# Patient Record
Sex: MALE | Race: WHITE | NOT HISPANIC OR LATINO | Employment: OTHER | URBAN - METROPOLITAN AREA
[De-identification: names, ages, dates, MRNs, and addresses within clinical notes are randomized per-mention and may not be internally consistent; named-entity substitution may affect disease eponyms.]

---

## 2023-10-05 ENCOUNTER — HOSPITAL ENCOUNTER (EMERGENCY)
Facility: HOSPITAL | Age: 68
Discharge: HOME | End: 2023-10-05
Attending: EMERGENCY MEDICINE | Admitting: EMERGENCY MEDICINE
Payer: MEDICARE

## 2023-10-05 VITALS
BODY MASS INDEX: 34.46 KG/M2 | SYSTOLIC BLOOD PRESSURE: 135 MMHG | OXYGEN SATURATION: 96 % | RESPIRATION RATE: 18 BRPM | HEART RATE: 111 BPM | TEMPERATURE: 97.6 F | HEIGHT: 73 IN | DIASTOLIC BLOOD PRESSURE: 95 MMHG | WEIGHT: 260 LBS

## 2023-10-05 PROCEDURE — 4500999001 HC ED NO CHARGE

## 2023-10-05 PROCEDURE — 99283 EMERGENCY DEPT VISIT LOW MDM: CPT | Performed by: EMERGENCY MEDICINE

## 2023-10-05 ASSESSMENT — PAIN - FUNCTIONAL ASSESSMENT: PAIN_FUNCTIONAL_ASSESSMENT: 0-10

## 2023-10-05 ASSESSMENT — COLUMBIA-SUICIDE SEVERITY RATING SCALE - C-SSRS
2. HAVE YOU ACTUALLY HAD ANY THOUGHTS OF KILLING YOURSELF?: NO
6. HAVE YOU EVER DONE ANYTHING, STARTED TO DO ANYTHING, OR PREPARED TO DO ANYTHING TO END YOUR LIFE?: NO
1. IN THE PAST MONTH, HAVE YOU WISHED YOU WERE DEAD OR WISHED YOU COULD GO TO SLEEP AND NOT WAKE UP?: NO

## 2023-12-24 ENCOUNTER — APPOINTMENT (OUTPATIENT)
Dept: RADIOLOGY | Facility: HOSPITAL | Age: 68
End: 2023-12-24
Payer: MEDICARE

## 2023-12-24 ENCOUNTER — HOSPITAL ENCOUNTER (EMERGENCY)
Facility: HOSPITAL | Age: 68
Discharge: HOME | End: 2023-12-24
Payer: MEDICARE

## 2023-12-24 ENCOUNTER — PHARMACY VISIT (OUTPATIENT)
Dept: PHARMACY | Facility: CLINIC | Age: 68
End: 2023-12-24

## 2023-12-24 VITALS
HEART RATE: 64 BPM | WEIGHT: 260 LBS | DIASTOLIC BLOOD PRESSURE: 85 MMHG | SYSTOLIC BLOOD PRESSURE: 141 MMHG | OXYGEN SATURATION: 100 % | HEIGHT: 73 IN | TEMPERATURE: 98.7 F | RESPIRATION RATE: 18 BRPM | BODY MASS INDEX: 34.46 KG/M2

## 2023-12-24 DIAGNOSIS — S39.012A LUMBAR STRAIN, INITIAL ENCOUNTER: Primary | ICD-10-CM

## 2023-12-24 PROCEDURE — 99283 EMERGENCY DEPT VISIT LOW MDM: CPT | Mod: 25

## 2023-12-24 PROCEDURE — 72100 X-RAY EXAM L-S SPINE 2/3 VWS: CPT

## 2023-12-24 PROCEDURE — 72100 X-RAY EXAM L-S SPINE 2/3 VWS: CPT | Performed by: RADIOLOGY

## 2023-12-24 PROCEDURE — RXMED WILLOW AMBULATORY MEDICATION CHARGE

## 2023-12-24 PROCEDURE — 99284 EMERGENCY DEPT VISIT MOD MDM: CPT

## 2023-12-24 PROCEDURE — 2500000004 HC RX 250 GENERAL PHARMACY W/ HCPCS (ALT 636 FOR OP/ED): Performed by: NURSE PRACTITIONER

## 2023-12-24 PROCEDURE — 96372 THER/PROPH/DIAG INJ SC/IM: CPT

## 2023-12-24 RX ORDER — KETOROLAC TROMETHAMINE 30 MG/ML
30 INJECTION, SOLUTION INTRAMUSCULAR; INTRAVENOUS ONCE
Status: COMPLETED | OUTPATIENT
Start: 2023-12-24 | End: 2023-12-24

## 2023-12-24 RX ORDER — LIDOCAINE 50 MG/G
1 PATCH TOPICAL DAILY
Qty: 5 PATCH | Refills: 0 | Status: SHIPPED | OUTPATIENT
Start: 2023-12-24

## 2023-12-24 RX ORDER — ORPHENADRINE CITRATE 30 MG/ML
60 INJECTION INTRAMUSCULAR; INTRAVENOUS ONCE
Status: COMPLETED | OUTPATIENT
Start: 2023-12-24 | End: 2023-12-24

## 2023-12-24 RX ORDER — MELOXICAM 15 MG/1
15 TABLET ORAL DAILY
Qty: 10 TABLET | Refills: 0 | Status: SHIPPED | OUTPATIENT
Start: 2023-12-24

## 2023-12-24 RX ORDER — CYCLOBENZAPRINE HCL 10 MG
10 TABLET ORAL 3 TIMES DAILY PRN
Qty: 17 TABLET | Refills: 0 | Status: SHIPPED | OUTPATIENT
Start: 2023-12-24

## 2023-12-24 RX ADMIN — KETOROLAC TROMETHAMINE 30 MG: 30 INJECTION, SOLUTION INTRAMUSCULAR at 06:40

## 2023-12-24 RX ADMIN — ORPHENADRINE CITRATE 60 MG: 60 INJECTION INTRAMUSCULAR; INTRAVENOUS at 06:40

## 2023-12-24 NOTE — ED PROVIDER NOTES
Chief Complaint   Patient presents with    Back Pain     Pt went to layn on th floor and threw his back out . Pt walked to the ems cot        HPI       68 year old male presents to the Emergency Department today complaining of a 1-2 day history of lower back pain that he describes as tight in nature, constant, non-radiating, and varies in intensity. Notes that the pain is worse with movement. Denies any injury/trauma to the area. Denies any associated fever, chills, headache, neck pain, chest pain, shortness of breath, abdominal pain, nausea, vomiting, diarrhea, constipation, hematemesis, hematochezia, melena, urinary symptoms, paresthesias, focal weakness of extremities, or problems with bowel or bladder function.       History provided by:  Patient             Patient History   No past medical history on file.  No past surgical history on file.  No family history on file.  Social History     Tobacco Use    Smoking status: Never    Smokeless tobacco: Never   Substance Use Topics    Alcohol use: Not Currently    Drug use: Never           Physical Exam  Constitutional:       Appearance: Normal appearance.   HENT:      Head: Normocephalic.      Right Ear: Tympanic membrane, ear canal and external ear normal.      Left Ear: Tympanic membrane, ear canal and external ear normal.      Nose: Nose normal. No septal deviation.      Right Turbinates: Not enlarged.      Left Turbinates: Not enlarged.      Mouth/Throat:      Lips: Pink.      Mouth: Mucous membranes are moist.      Dentition: No dental caries.      Tongue: No lesions.      Pharynx: Oropharynx is clear. Uvula midline.      Tonsils: No tonsillar exudate. 1+ on the right. 1+ on the left.   Eyes:      General: Lids are normal.      Conjunctiva/sclera: Conjunctivae normal.   Cardiovascular:      Rate and Rhythm: Normal rate and regular rhythm.      Pulses:           Radial pulses are 3+ on the right side and 3+ on the left side.      Heart sounds: Normal heart  sounds. No murmur heard.     No friction rub. No gallop.   Pulmonary:      Effort: Pulmonary effort is normal.      Breath sounds: Normal breath sounds. No wheezing, rhonchi or rales.   Abdominal:      General: Abdomen is flat. Bowel sounds are normal.      Palpations: Abdomen is soft.      Tenderness: There is no abdominal tenderness. There is no right CVA tenderness or left CVA tenderness. Negative signs include Stone's sign and McBurney's sign.   Musculoskeletal:      Cervical back: Full passive range of motion without pain and neck supple.      Comments: No edema, cyanosis, or clubbing noted. No spinous process tenderness, but does have bilateral paraspinal muscle tenderness to the lumbar sacral region. No saddle paresthesias. Negative straight leg raises. Able to plantarflex and dorsiflex bilateral great toes without difficulty.   Lymphadenopathy:      Cervical: No cervical adenopathy.   Skin:     General: Skin is warm.      Capillary Refill: Capillary refill takes less than 2 seconds.      Findings: No petechiae or rash. Rash is not purpuric.   Neurological:      Mental Status: He is alert and oriented to person, place, and time.      Sensory: Sensation is intact.      Motor: Motor function is intact.      Coordination: Coordination is intact.      Gait: Gait is intact.   Psychiatric:         Attention and Perception: Attention normal.         Mood and Affect: Mood normal.         Speech: Speech normal.         Behavior: Behavior normal. Behavior is cooperative.         Labs Reviewed - No data to display    XR lumbar spine 2-3 views   Final Result   No radiographic evidence of an acute fracture or subluxation.   Degenerative changes.        MACRO:   None.        Signed by: Jed Miller 12/24/2023 7:26 AM   Dictation workstation:   [id number]               ED Course & MDM   Diagnoses as of 12/24/23 0835   Lumbar strain, initial encounter           Medical Decision Making  Patient was seen and evaluated by  myself. Lumbar xx-rays show no acute pathology. Exhibits no signs of cauda equina or spinal epidural abscess. Instructed to apply ice or heat as needed. Given injections of Toradol and Norflex with improvement in the pain. Given prescriptions for Mobic, Lidoderm patches, and Flexeril. No contraindications to NSAIDs are noted. Able to ambulate without difficulty and requests to go home. Follow up with their doctor in 1 week. Return if worse in any way. Discharged in stable condition with computer instructions.    Diagnostic Impression:     1. Acute lumbar muscle strain    2. IM Injections    3. Prescription therapy            Your medication list      You have not been prescribed any medications.           Procedure  Procedures     Galindo Hassan, REY-CNP  12/24/23 0812

## 2024-02-15 ENCOUNTER — LAB REQUISITION (OUTPATIENT)
Dept: LAB | Facility: HOSPITAL | Age: 69
End: 2024-02-15
Payer: COMMERCIAL

## 2024-02-15 DIAGNOSIS — L03.011 CELLULITIS OF RIGHT FINGER: ICD-10-CM

## 2024-02-15 PROCEDURE — 87075 CULTR BACTERIA EXCEPT BLOOD: CPT

## 2024-02-15 PROCEDURE — 87070 CULTURE OTHR SPECIMN AEROBIC: CPT

## 2024-02-15 PROCEDURE — 87205 SMEAR GRAM STAIN: CPT

## 2024-02-18 LAB
BACTERIA SPEC CULT: NORMAL
GRAM STN SPEC: NORMAL
GRAM STN SPEC: NORMAL